# Patient Record
Sex: MALE | Race: BLACK OR AFRICAN AMERICAN
[De-identification: names, ages, dates, MRNs, and addresses within clinical notes are randomized per-mention and may not be internally consistent; named-entity substitution may affect disease eponyms.]

---

## 2019-06-27 ENCOUNTER — HOSPITAL ENCOUNTER (EMERGENCY)
Dept: HOSPITAL 74 - JERFT | Age: 19
Discharge: HOME | End: 2019-06-27
Payer: COMMERCIAL

## 2019-06-27 VITALS — BODY MASS INDEX: 21 KG/M2

## 2019-06-27 VITALS — DIASTOLIC BLOOD PRESSURE: 81 MMHG | TEMPERATURE: 99.1 F | SYSTOLIC BLOOD PRESSURE: 132 MMHG | HEART RATE: 88 BPM
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
## 2019-06-27 DIAGNOSIS — S05.01XA: Primary | ICD-10-CM
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
## 2019-06-27 PROCEDURE — 4A07X0Z MEASUREMENT OF VISUAL ACUITY, EXTERNAL APPROACH: ICD-10-PCS

## 2019-06-27 NOTE — PDOC
History of Present Illness





- General


Chief Complaint: Eye Problem


Stated Complaint: EYE INJURY


Time Seen by Provider: 06/27/19 21:30


History Source: Patient





- History of Present Illness


Initial Comments: 





06/27/19 22:12


19 year old male with right eye redness and pain after dust fell in eye this 

morning. no vision changes,





no pmhx





Past History





- Past Medical History


Allergies/Adverse Reactions: 


 Allergies











Allergy/AdvReac Type Severity Reaction Status Date / Time


 


No Known Allergies Allergy   Verified 01/24/15 23:45











Home Medications: 


Ambulatory Orders





Ibuprofen [Motrin] 600 mg PO TID #20 tablet 01/25/15 


Ondansetron [Zofran *Odt*] 4 mg SL TID #30 od.tablet 01/25/15 








COPD: No





- Suicide/Smoking/Psychosocial Hx


Smoking History: Never smoked


Have you smoked in the past 12 months: No


Hx Alcohol Use: No


Substance Use Type: None





**Review of Systems





- Review of Systems


Able to Perform ROS?: Yes


Is the patient limited English proficient: No


HEENTM: Yes: Eye Pain (right)





*Physical Exam





- Vital Signs


 Last Vital Signs











Temp Pulse Resp BP Pulse Ox


 


 99.1 F   88   18   132/81   98 


 


 06/27/19 21:29  06/27/19 21:29  06/27/19 21:29  06/27/19 21:29  06/27/19 21:29














- Physical Exam


General Appearance: Yes: Appropriately Dressed


HEENT: positive: Other (right eye erythema + fluroscein uptake to conjunctiva 

and pupils. snellen 20/20)





Medical Decision Making





- Medical Decision Making





06/27/19 22:16


A: corneal abrasion





P: fluroscein





erythromycin





*DC/Admit/Observation/Transfer


Diagnosis at time of Disposition: 


Right corneal abrasion


Qualifiers:


 Encounter type: initial encounter Qualified Code(s): S05.01XA - Injury of 

conjunctiva and corneal abrasion without foreign body, right eye, initial 

encounter








- Discharge Dispostion


Disposition: HOME





- Referrals


Referrals: 


Faustina Avila MD [Primary Care Provider] - 


Gurmeet Holden MD [Staff Physician] - 24 hours





- Patient Instructions


Printed Discharge Instructions:  DI for Corneal Abrasion


Additional Instructions: 


use erythromycin as prescribed





need to follow up with ophthalmology as soon as possible








return to the ER for any worsening symptoms. 





- Post Discharge Activity

## 2019-06-27 NOTE — PDOC
Rapid Medical Evaluation


Chief Complaint: Eye Problem


Time Seen by Provider: 06/27/19 21:30


Medical Evaluation: 


 Allergies











Allergy/AdvReac Type Severity Reaction Status Date / Time


 


No Known Allergies Allergy   Verified 01/24/15 23:45











06/27/19 21:30


I have performed a brief in-person evaluation of this patient. 


The patient presents with a chief complaint of: right eye pain and scratiness - 

felt FB land in eye this am- no relief with water rinsing 


Pertinent physical exam findings: injected with no noted FB/ visula acuity wnl 


I have ordered the following: nothing 


The patient will proceed to the ED for further evaluation.





**Discharge Disposition





- Diagnosis


 Eye foreign body








- Referrals





- Patient Instructions





- Post Discharge Activity